# Patient Record
Sex: FEMALE | Race: BLACK OR AFRICAN AMERICAN | URBAN - METROPOLITAN AREA
[De-identification: names, ages, dates, MRNs, and addresses within clinical notes are randomized per-mention and may not be internally consistent; named-entity substitution may affect disease eponyms.]

---

## 2022-04-01 ENCOUNTER — WEB ENCOUNTER (OUTPATIENT)
Dept: URBAN - METROPOLITAN AREA CLINIC 84 | Facility: CLINIC | Age: 52
End: 2022-04-01

## 2022-04-01 ENCOUNTER — OFFICE VISIT (OUTPATIENT)
Dept: URBAN - METROPOLITAN AREA CLINIC 84 | Facility: CLINIC | Age: 52
End: 2022-04-01
Payer: OTHER GOVERNMENT

## 2022-04-01 DIAGNOSIS — K76.0 FATTY LIVER: ICD-10-CM

## 2022-04-01 DIAGNOSIS — R79.89 ELEVATED LFTS: ICD-10-CM

## 2022-04-01 DIAGNOSIS — Z12.11 SCREENING COLONOSCOPY: ICD-10-CM

## 2022-04-01 PROCEDURE — 99204 OFFICE O/P NEW MOD 45 MIN: CPT | Performed by: INTERNAL MEDICINE

## 2022-04-01 RX ORDER — DICLOFENAC SODIUM 75 MG/1
TAKE ONE TABLET BY MOUTH TWICE A DAY TABLET, DELAYED RELEASE ORAL
Qty: 60 | Refills: 2 | Status: ACTIVE | COMMUNITY

## 2022-04-01 RX ORDER — ALBUTEROL SULFATE 2.5 MG/3ML
INHALE ONE VIAL VIA NEBULIZER THREE TIMES A DAY AS NEEDED SOLUTION RESPIRATORY (INHALATION)
Qty: 90 | Refills: 1 | Status: ACTIVE | COMMUNITY

## 2022-04-01 RX ORDER — POLYETHYLENE GLYCOL 3350, SODIUM SULFATE, SODIUM CHLORIDE, POTASSIUM CHLORIDE, ASCORBIC ACID, SODIUM ASCORBATE 140-9-5.2G
AS DIRECTED KIT ORAL AS DIRECTED
Qty: 1 BOX | Refills: 0 | OUTPATIENT
Start: 2022-04-01 | End: 2022-04-02

## 2022-04-01 RX ORDER — NITROFURANTOIN MONOHYDRATE/MACROCRYSTALLINE 25; 75 MG/1; MG/1
TAKE 1 CAPSULE BY MOUTH TWICE A DAY CAPSULE ORAL
Qty: 10 | Refills: 0 | Status: DISCONTINUED | COMMUNITY

## 2022-04-01 NOTE — HPI-TODAY'S VISIT:
The patient was referred by Dr. Georgette Krishnamurthy for abnormal US .   A copy of this document is being forwarded to the referring provider.  Overall she feels well.  She denies arneoxia or weight loss.  SHe denies abdominal pain.  She denies UGI symptoms.  SHe does have some belch.  She has chronic irregular BM's.  She uses a colon cleanse and high fiber diet that helps her move her bowels.  She denies LGI bleed or melena.  She does not have DM and denies alcohol.  SHe ahd mildly elevtaed LFT's in 10/2021.  They were again mildly elevated in 1/2022.  Hep serologies were negative.  US from 3/8 shows fatty liver.  She has not had a colonoscopy

## 2022-04-02 ENCOUNTER — LAB OUTSIDE AN ENCOUNTER (OUTPATIENT)
Dept: URBAN - METROPOLITAN AREA CLINIC 84 | Facility: CLINIC | Age: 52
End: 2022-04-02

## 2022-04-07 LAB
ACTIN (SMOOTH MUSCLE) ANTIBODY: 9
AFP, SERUM: 2.7
AFP-L3%, SERUM: (no result)
ALPHA 2-MACROGLOBULINS, QN: 103
ALPHA-1-ANTITRYPSIN, SERUM: 128
ALT (SGPT) P5P: 36
ANA DIRECT: NEGATIVE
APOLIPOPROTEIN A-1: 116
AST (SGOT) P5P: 40
BILIRUBIN, TOTAL: 0.1
CHOLESTEROL, TOTAL: 189
COMMENT:: (no result)
FERRITIN, SERUM: 213
FIBROSIS SCORE: 0.02
FIBROSIS SCORING:: (no result)
FIBROSIS STAGE: (no result)
GGT: 25
GLUCOSE, SERUM: 105
HAPTOGLOBIN: 208
HEIGHT:: 65
HEP A AB, TOTAL: NEGATIVE
INTERPRETATIONS:: (no result)
IRON BIND.CAP.(TIBC): 323
IRON SATURATION: 21
IRON: 69
LIMITATIONS:: (no result)
MITOCHONDRIAL (M2) ANTIBODY: <20
NASH GRADE: (no result)
NASH SCORE: 0.5
NASH SCORING: (no result)
PHENOTYPE (PI): (no result)
STEATOSIS GRADE: (no result)
STEATOSIS GRADING: (no result)
STEATOSIS SCORE: 0.73
TRIGLYCERIDES: 70
UIBC: 254
WEIGHT:: 300

## 2022-04-27 ENCOUNTER — WEB ENCOUNTER (OUTPATIENT)
Dept: URBAN - METROPOLITAN AREA CLINIC 84 | Facility: CLINIC | Age: 52
End: 2022-04-27

## 2022-05-26 ENCOUNTER — TELEPHONE ENCOUNTER (OUTPATIENT)
Dept: URBAN - METROPOLITAN AREA CLINIC 92 | Facility: CLINIC | Age: 52
End: 2022-05-26

## 2022-05-26 ENCOUNTER — OFFICE VISIT (OUTPATIENT)
Dept: URBAN - METROPOLITAN AREA MEDICAL CENTER 17 | Facility: MEDICAL CENTER | Age: 52
End: 2022-05-26
Payer: OTHER GOVERNMENT

## 2022-05-26 DIAGNOSIS — Z12.11 COLON CANCER SCREENING: ICD-10-CM

## 2022-05-26 PROCEDURE — 45378 DIAGNOSTIC COLONOSCOPY: CPT | Performed by: INTERNAL MEDICINE

## 2022-05-26 RX ORDER — ALBUTEROL SULFATE 2.5 MG/3ML
INHALE ONE VIAL VIA NEBULIZER THREE TIMES A DAY AS NEEDED SOLUTION RESPIRATORY (INHALATION)
Qty: 90 | Refills: 1 | Status: ACTIVE | COMMUNITY

## 2022-05-26 RX ORDER — DICLOFENAC SODIUM 75 MG/1
TAKE ONE TABLET BY MOUTH TWICE A DAY TABLET, DELAYED RELEASE ORAL
Qty: 60 | Refills: 2 | Status: ACTIVE | COMMUNITY

## 2022-11-02 ENCOUNTER — OFFICE VISIT (OUTPATIENT)
Dept: URBAN - METROPOLITAN AREA TELEHEALTH 2 | Facility: TELEHEALTH | Age: 52
End: 2022-11-02
Payer: OTHER GOVERNMENT

## 2022-11-02 VITALS
TEMPERATURE: 97.9 F | SYSTOLIC BLOOD PRESSURE: 138 MMHG | HEART RATE: 75 BPM | HEIGHT: 65 IN | WEIGHT: 293 LBS | BODY MASS INDEX: 48.82 KG/M2 | DIASTOLIC BLOOD PRESSURE: 81 MMHG

## 2022-11-02 DIAGNOSIS — K75.81 NASH (NONALCOHOLIC STEATOHEPATITIS): ICD-10-CM

## 2022-11-02 PROCEDURE — 99213 OFFICE O/P EST LOW 20 MIN: CPT | Performed by: INTERNAL MEDICINE

## 2022-11-02 RX ORDER — DICLOFENAC SODIUM 75 MG/1
TAKE ONE TABLET BY MOUTH TWICE A DAY TABLET, DELAYED RELEASE ORAL
Qty: 60 | Refills: 2 | Status: ACTIVE | COMMUNITY

## 2022-11-02 RX ORDER — ALBUTEROL SULFATE 2.5 MG/3ML
INHALE ONE VIAL VIA NEBULIZER THREE TIMES A DAY AS NEEDED SOLUTION RESPIRATORY (INHALATION)
Qty: 90 | Refills: 1 | Status: ACTIVE | COMMUNITY

## 2022-11-02 NOTE — HPI-TODAY'S VISIT:
Labs c/w CHOW.  Colonscopy was normal with some residual sotol and diverticulosis with a 5 year recall.  Overall she feels well.  She denies abdominal pain.  She denies anroexia or weight loss.  She denies UGI symptoms.  She denies LGI symptoms.  SHe denies LGI bleed or melena.

## 2022-11-09 ENCOUNTER — DASHBOARD ENCOUNTERS (OUTPATIENT)
Age: 52
End: 2022-11-09

## 2022-11-09 PROBLEM — 442685003: Status: ACTIVE | Noted: 2022-11-02

## 2022-11-09 PROBLEM — 197321007 FATTY LIVER: Status: ACTIVE | Noted: 2022-04-01
